# Patient Record
(demographics unavailable — no encounter records)

---

## 2024-10-17 NOTE — HISTORY OF PRESENT ILLNESS
[FreeTextEntry1] : Harmony Dias returns stating that the wart on the right heel is doing a lot better.  She barely notices it.  She has been good about putting the medication on and thinks it is getting better.

## 2024-10-17 NOTE — PHYSICAL EXAM
[TextEntry] : There is a small area of white verrucoid tissue present.  Pinpoint bleeding is present upon debridement of the area.  The skin lines are returning.

## 2024-11-07 NOTE — PHYSICAL EXAM
[TextEntry] : There is scant verrucoid tissue on the plantar surface of the right heel.  Pinpoint bleeding is noted upon debridement of the area.

## 2024-11-07 NOTE — HISTORY OF PRESENT ILLNESS
[FreeTextEntry1] : Harmony Dias returns stating that the right heel is doing much better.  She thinks it is almost gone.  She has been putting the medicine on as directed.

## 2024-11-07 NOTE — ASSESSMENT
[FreeTextEntry1] : Assessment: Verruca plantaris, one lesion, right foot.  Plan: I performed with a sterile #15 blade on a sterile # 3 handle aseptic debridement of all the verrucous tissue followed by application of Trichloracetic acid for chemical destruction of the warts. The patient was instructed to continue to apply the Verrustat to the effected area(s) twice per day and continue with the Clean Sweep spray to disinfect the shoes to help prevent recurrence.  PTR:  2 weeks.

## 2024-11-20 NOTE — ASSESSMENT
[FreeTextEntry1] : Assessment: Foreign body splinter, right foot. Verruca plantaris, one lesion, right foot.  Plan:   I performed with a sterile #15 blade on a sterile # 3 handle aseptic debridement of all the verrucous tissue followed by application of Trichloracetic acid for chemical destruction of the warts. The patient was instructed to continue to apply the Verrustat to the effected area(s) twice per day and continue with the Clean Sweep spray to disinfect the shoes to help prevent recurrence.  Utilizing a sterile #15 blade, through sharp and blunt dissection, I debrided out a splinter from the plantar aspect of the right fourth metatarsal head.  No pus or discharge was expressed.  I cleansed the area with normal saline, applied Amerigel with a sterile dressing and dispersion.  I then told her to start soaking her foot in lukewarm water and Epsom salts, 2 tablespoons per pint for 20 minutes twice per day.  She was advised to dry her feet with a clean towel and then apply a sterile dressing to the area until symptoms and objective findings return to normal.  I advised the patient to notify the office right away if increased redness, swelling, pain, open wounds or discharge were observed.  She will follow up here in two weeks.  PTR:  2 weeks.  not applicable (Male)

## 2024-11-20 NOTE — PHYSICAL EXAM
[TextEntry] : There is a verrucoid lesion on the plantar aspect of the right heel.  The skin lines are returning.  The area is white with black spots.   Plantar to the fourth metatarsal head, there is a white area with one black spot.  It is painful to direct palpation.  The surrounding tissue is painful.  No pos or fluctuance is present.

## 2024-12-03 NOTE — HISTORY OF PRESENT ILLNESS
[FreeTextEntry1] : Harmony Dias returns stating she stopped putting medicine on the wart because she thinks it is gone.  It has not been hurting, and she does not see any problems.

## 2024-12-03 NOTE — PHYSICAL EXAM
[TextEntry] : There is a small area of verrucoid tissue on the right plantar medial heel.  The skin lines have returned.

## 2024-12-03 NOTE — ASSESSMENT
[FreeTextEntry1] : Assessment: Verruca plantaris, one lesion, right foot.  Plan: I performed aseptic debridement with a sterile #15 blade on a sterile # 3 handle of all the verrucous tissue followed by application of Trichloracetic acid for chemical destruction of the warts. Normal skin lines were present after the debridement without visualization of black dots.  The patient is to continue with the VerruStat 2x a day for one more week and then discontinue.  The was instructed to continue to disinfect their shoes with the Clean Sweep to prevent recurrence.  Patient is to observe the foot weekly for any new growths and return in 1 month.

## 2025-01-27 NOTE — HISTORY OF PRESENT ILLNESS
[FreeTextEntry1] : Harmony Dias returns stating that the she felt something on her right heel.  She states that she felt it was "pinching."  She did not really see much, but she wanted to make sure that there was nothing there because she had had a wart before that had come back after treatment.

## 2025-01-27 NOTE — ASSESSMENT
[FreeTextEntry1] : Assessment: Verruca plantaris, one lesion, right foot.  Plan:   I discussed various treatment options including surgical removal of the wart(s) and the patient opted for conservative treatment at this time.  I then performed aseptic debridement of all the verrucous tissue followed by application of Trichloracetic acid for chemical destruction of the warts. I dispensed to the patient VerruStat topical solution Salicylic Acid 17% in a vehicle containing Retinyl Palmitate and penetration-enhancer MSM Unique wart removing formulation with added Vitamin A derivative, Retinyl Palmitate, and permeation-enhancing agents.  VerruStat has been specially formulated for the topical management and removal of clinically common and palmoplantar warts. VerruStatTM is dispensed only by physicians. The patient was given instructions to apply the medication to the wart(s) 2x a day. I explained that it had a film that builds up and can be filed by the patient, but not picked at.  No covering would be necessary. The patient was advised not to walk barefoot.  I advised the patient that warts were contagious and discussed methods of avoiding the spreading of warts.  Patient is to return in 2 weeks for follow-up.  After discussing treatment options with Harmony, we both agree that conservative treatment is still indicated.  She will return in two weeks.

## 2025-01-27 NOTE — PHYSICAL EXAM
[TextEntry] : There is scant white verrucoid tissue on the plantar surface of the right heel.  It is painful to direct palpation.  It exhibits pinpoint bleeding upon debridement.

## 2025-02-17 NOTE — HISTORY OF PRESENT ILLNESS
[FreeTextEntry1] : She returns stating that her right heel is feeling okay, but she knows the wart is there.  She has been putting the medicine on it regularly.

## 2025-02-17 NOTE — PHYSICAL EXAM
[TextEntry] : There is a white flat lesion with multiple black spots on the plantar surface of the right heel.  Pinpoint bleeding is present upon debridement of the lesion.

## 2025-03-03 NOTE — HISTORY OF PRESENT ILLNESS
[FreeTextEntry1] : Harmony Dias returns stating that the wart on the right foot does not bother her much, but it does not seem to be progressing as much as she had hoped.  She is putting the medicine on regularly.

## 2025-03-03 NOTE — HISTORY OF PRESENT ILLNESS
[FreeTextEntry1] : Harmony Dias returns stating that the wart on the right foot does not bother her much, but it does not seem to be progressing as much as she had hoped.  She is putting the medicine on regularly. d and c

## 2025-03-03 NOTE — ASSESSMENT
[FreeTextEntry1] : Assessment: Verruca plantaris, one lesion, right foot.  Plan: I performed aseptic debridement with a sterile #15 blade on a sterile # 3 handle of all the verrucous tissue followed by application of Trichloracetic acid for chemical destruction of the warts.  The patient was instructed to continue to apply the Verrustat to the effected area(s) twice per day and continue with the Clean Sweep spray to disinfect the shoes to help prevent recurrence.  We discussed her treatment plan.  I changed her from the topical acids to Aldara to be applied once daily to the wart under occlusion.  We discussed the risks, benefits, and alternatives to this treatment, but I feel change is necessary and Harmony also felt that was necessary.  PTR:  2 weeks.

## 2025-03-03 NOTE — PHYSICAL EXAM
[TextEntry] : On the plantar surface of the right heel, there is a white verrucoid lesion with multiple black spots.  Pain upon palpation is moderate.  Pinpoint bleeding is noted upon debridement.

## 2025-03-18 NOTE — PHYSICAL EXAM
[TextEntry] : On the plantar surface of the right heel, there is a white lesion with black spots.  Pinpoint bleeding is present upon debridement.

## 2025-03-18 NOTE — HISTORY OF PRESENT ILLNESS
[FreeTextEntry1] : Harmony Dias returns stating she is using the new cream and does not see much of a difference yet.  She states the wart has not been hurting.

## 2025-03-18 NOTE — ASSESSMENT
[FreeTextEntry1] : Assessment: Verruca plantaris, one lesion, right foot.  Plan: I performed aseptic debridement with a sterile #15 blade on a sterile # 3 handle of all the verrucous tissue followed by application of Trichloracetic acid for chemical destruction of the warts.   She will continue the Aldara cream once at night under occlusion.  PTR:  2 weeks.

## 2025-03-31 NOTE — PHYSICAL EXAM
[TextEntry] : On the plantar surface of the right heel, there is a white lesion with black spots present.  There is diminished pain upon palpation.  Pinpoint bleeding is noted upon debridement.

## 2025-03-31 NOTE — ASSESSMENT
[FreeTextEntry1] : Assessment: Verruca plantaris, one lesion, right foot.  Plan: I performed aseptic debridement with a sterile #15 blade on a sterile # 3 handle of all the verrucous tissue followed by application of Trichloracetic acid for chemical destruction of the warts.  She will continue to utilize the Aldara nightly under occlusion.  PTR 2 weeks

## 2025-03-31 NOTE — HISTORY OF PRESENT ILLNESS
[FreeTextEntry1] : Harmony Dias returns stating that the wart seems to be doing better since we changed medications.  She denies much pain.

## 2025-04-18 NOTE — PHYSICAL EXAM
[TextEntry] : On the plantar surface of the right heel, there is a white verrucoid lesion with black spots.  It is much less painful.  There is pinpoint bleeding noted upon debridement.

## 2025-04-18 NOTE — HISTORY OF PRESENT ILLNESS
[FreeTextEntry1] : Harmony Dias returns stating that the right heel feels better, but she still feels that the wart is there.  She states that it still bothers her at times.

## 2025-04-18 NOTE — ASSESSMENT
[FreeTextEntry1] : Assessment: Verruca plantaris, one lesion right heel.  Plan: I performed aseptic debridement with a sterile #15 blade on a sterile # 3 handle of all the verrucous tissue followed by application of Trichloracetic acid for chemical destruction of the warts.   She will continue the Aldara nightly under occlusion and return in 2 weeks.

## 2025-05-06 NOTE — HISTORY OF PRESENT ILLNESS
[FreeTextEntry1] : She returns stating that since she was here she does notice improvement.  She started soaking the foot as well as putting the medication on and thinks it is getting better.

## 2025-05-06 NOTE — PHYSICAL EXAM
[TextEntry] : There is a white lesion with maceration present on the right heel.  Black spots are noted.  Pinpoint bleeding is present upon debridement of the lesion.

## 2025-05-06 NOTE — ASSESSMENT
[FreeTextEntry1] : Assessment: Verruca plantaris, one lesion, right foot.  Plan: I performed aseptic debridement with a sterile #15 blade on a sterile # 3 handle of all the verrucous tissue followed by application of Trichloracetic acid for chemical destruction of the warts.  The patient was instructed to continue to apply the Verrustat to the effected area(s) twice per day and continue with the Clean Sweep spray to disinfect the shoes to help prevent recurrence.  She will continue the Aldara cream nightly under occlusion and return here in two weeks.  PTR:  2 weeks

## 2025-05-27 NOTE — ASSESSMENT
[FreeTextEntry1] : Assessment: Verruca plantaris, one lesion, right foot.  Plan: I performed aseptic debridement with a sterile #15 blade on a sterile # 3 handle of all the verrucous tissue followed by application of Trichloracetic acid for chemical destruction of the warts.   She will continue the Aldara cream nightly under occlusion.   PTR:  2 weeks.

## 2025-05-27 NOTE — HISTORY OF PRESENT ILLNESS
[FreeTextEntry1] : Harmony Dias returns stating she thinks the heel is finally starting to do better.  She has been putting the medication on nightly and soaking the foot.

## 2025-05-27 NOTE — PHYSICAL EXAM
[TextEntry] : There is a flattened right verrucoid lesion on the right heel plantar surface with black spots.  Pinpoint bleeding is present upon debridement.

## 2025-06-09 NOTE — HISTORY OF PRESENT ILLNESS
[FreeTextEntry1] : Harmony Dias returns stating that she thinks the wart is doing better.  It is not bothering her as much.  She is putting the cream on every night.

## 2025-06-09 NOTE — PHYSICAL EXAM
[TextEntry] : On the plantar surface of the right heel, there is a white lesion with black spots.  Pinpoint bleeding is noted upon debridement.  It appears the skin lines are starting to return.

## 2025-06-09 NOTE — ASSESSMENT
[FreeTextEntry1] : Assessment: Verruca plantaris, one lesion, right heel.  Plan: I performed aseptic debridement with a sterile #15 blade on a sterile # 3 handle of all the verrucous tissue followed by application of Trichloracetic acid for chemical destruction of the warts.  The patient was instructed to continue to apply the Verrustat to the effected area(s) twice per day and continue with the Clean Sweep spray to disinfect the shoes to help prevent recurrence.  She will continue the Aldara daily under occlusion and return here in two weeks.  PTR:  2 weeks.

## 2025-06-30 NOTE — ASSESSMENT
[FreeTextEntry1] : Assessment: Verruca plantaris, one lesion, right heel.  Plan:   I performed aseptic debridement with a sterile #15 blade on a sterile # 3 handle of all the verrucous tissue followed by application of Trichloracetic acid for chemical destruction of the warts.  The patient was instructed to continue to apply the Verrustat to the effected area(s) twice per day and continue with the Clean Sweep spray to disinfect the shoes to help prevent recurrence.  She will continue the Aldara everyday.  PTR:  2 weeks.

## 2025-06-30 NOTE — HISTORY OF PRESENT ILLNESS
[FreeTextEntry1] : Harmony returns stating the right heel has looked the best that it has looked in a while.  She has been using the Aldara every other night.

## 2025-06-30 NOTE — PHYSICAL EXAM
[TextEntry] : The right heel lesion is improving.  The skin lines are returning.  There is a small area of verrucoid tissue present.